# Patient Record
Sex: MALE | Race: WHITE | NOT HISPANIC OR LATINO | URBAN - METROPOLITAN AREA
[De-identification: names, ages, dates, MRNs, and addresses within clinical notes are randomized per-mention and may not be internally consistent; named-entity substitution may affect disease eponyms.]

---

## 2017-02-22 ENCOUNTER — OUTPATIENT (OUTPATIENT)
Dept: OUTPATIENT SERVICES | Facility: HOSPITAL | Age: 47
LOS: 1 days | Discharge: HOME | End: 2017-02-22

## 2017-03-13 ENCOUNTER — APPOINTMENT (OUTPATIENT)
Dept: SURGERY | Facility: CLINIC | Age: 47
End: 2017-03-13

## 2017-03-13 PROBLEM — Z00.00 ENCOUNTER FOR PREVENTIVE HEALTH EXAMINATION: Status: ACTIVE | Noted: 2017-03-13

## 2017-06-27 DIAGNOSIS — T81.4XXA INFECTION FOLLOWING A PROCEDURE, INITIAL ENCOUNTER: ICD-10-CM

## 2021-06-25 ENCOUNTER — APPOINTMENT (OUTPATIENT)
Age: 51
End: 2021-06-25

## 2022-05-13 ENCOUNTER — APPOINTMENT (OUTPATIENT)
Age: 52
End: 2022-05-13
Payer: COMMERCIAL

## 2022-05-13 VITALS
DIASTOLIC BLOOD PRESSURE: 84 MMHG | OXYGEN SATURATION: 96 % | WEIGHT: 230 LBS | HEIGHT: 72 IN | HEART RATE: 86 BPM | SYSTOLIC BLOOD PRESSURE: 140 MMHG | BODY MASS INDEX: 31.15 KG/M2

## 2022-05-13 DIAGNOSIS — R07.9 CHEST PAIN, UNSPECIFIED: ICD-10-CM

## 2022-05-13 DIAGNOSIS — R91.1 SOLITARY PULMONARY NODULE: ICD-10-CM

## 2022-05-13 PROCEDURE — 99213 OFFICE O/P EST LOW 20 MIN: CPT

## 2022-05-21 PROBLEM — R07.9 CHEST PAIN, UNSPECIFIED: Status: ACTIVE | Noted: 2022-05-21

## 2022-05-21 PROBLEM — R91.1 LUNG NODULE: Status: ACTIVE | Noted: 2022-05-21

## 2022-06-27 ENCOUNTER — APPOINTMENT (OUTPATIENT)
Dept: NEUROLOGY | Facility: CLINIC | Age: 52
End: 2022-06-27
Payer: COMMERCIAL

## 2022-06-27 VITALS
HEART RATE: 106 BPM | BODY MASS INDEX: 29.93 KG/M2 | TEMPERATURE: 95.9 F | RESPIRATION RATE: 16 BRPM | SYSTOLIC BLOOD PRESSURE: 120 MMHG | DIASTOLIC BLOOD PRESSURE: 80 MMHG | WEIGHT: 221 LBS | OXYGEN SATURATION: 98 % | HEIGHT: 72 IN

## 2022-06-27 DIAGNOSIS — M54.12 RADICULOPATHY, CERVICAL REGION: ICD-10-CM

## 2022-06-27 PROCEDURE — 99204 OFFICE O/P NEW MOD 45 MIN: CPT

## 2022-06-27 NOTE — PHYSICAL EXAM
[FreeTextEntry1] : Motor: atrophy of right supraspinatus, infraspinatus, deltoid, and less so biceps; right shoulder abduction 2/5, external rotation 2/5, elbow flexion in supinated position 2/5, in half pronated position 4-/5, elbow extension 5/5, distally 5/5 b/l UE \par Sensory: intact to LT/PP in b/l UE \par Reflexes: right biceps absent, brachioradialis 1+, triceps 2+; LUE 2+ \par Gait: normal

## 2022-06-27 NOTE — CONSULT LETTER
[Dear  ___] : Dear  [unfilled], [Consult Letter:] : I had the pleasure of evaluating your patient, [unfilled]. [Please see my note below.] : Please see my note below. [Consult Closing:] : Thank you very much for allowing me to participate in the care of this patient.  If you have any questions, please do not hesitate to contact me. [Sincerely,] : Sincerely, [FreeTextEntry3] : Chadwick Bernard M.D.\par Neurology, Electromyography and Neuromuscular Medicine\par [facility]\par \par  of Neurology\par [facility] / [facility] [employer]

## 2022-06-27 NOTE — ASSESSMENT
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

## 2022-06-27 NOTE — HISTORY OF PRESENT ILLNESS
[FreeTextEntry1] : Referred by Dr. Mai Villalobos for right shoulder weakness\par Symptoms started a few years ago with progressive weakness without significant pain \par He was found to have tears in the rotator cuff and biceps tendon, had surgery 2.5 years ago followed by PT without improvement\par He has numbness in the right lateral upper arm, also some muscle spasms around the shoulder\par \par Reviewed:\par NCS/EMG 2021 - right upper brachial plexopathy \par x-ray 5/2022 - elevation of right hemidiaphragm\par pulmonology note - no pulmonary symptoms

## 2023-01-31 ENCOUNTER — NON-APPOINTMENT (OUTPATIENT)
Age: 53
End: 2023-01-31

## 2024-03-25 ENCOUNTER — APPOINTMENT (OUTPATIENT)
Dept: PAIN MANAGEMENT | Facility: CLINIC | Age: 54
End: 2024-03-25
Payer: COMMERCIAL

## 2024-03-25 DIAGNOSIS — G54.0 BRACHIAL PLEXUS DISORDERS: ICD-10-CM

## 2024-03-25 PROCEDURE — 20610 DRAIN/INJ JOINT/BURSA W/O US: CPT | Mod: 50

## 2024-03-25 PROCEDURE — 99204 OFFICE O/P NEW MOD 45 MIN: CPT | Mod: 25

## 2024-03-25 RX ORDER — METHOCARBAMOL 750 MG/1
750 TABLET, FILM COATED ORAL
Qty: 30 | Refills: 1 | Status: ACTIVE | COMMUNITY
Start: 2024-03-25 | End: 1900-01-01

## 2024-03-25 NOTE — PHYSICAL EXAM
[de-identified] : SHOULDER- AB duction pain with resistance. Hawkin's positive on the left.  right shoulder--pain with PROM in abduction

## 2024-03-25 NOTE — PROCEDURE
[Large Joint Injection] : Large joint injection [Shoulder] : shoulder [Pain] : pain [Alcohol] : alcohol [___ cc    0.25%] : Bupivacaine (Marcaine) ~Vcc of 0.25%  [___ cc    4mg] : Dexamethasone (Decadron) ~Vcc of 4 mg  [Call if redness, pain or fever occur] : call if redness, pain or fever occur [Apply ice for 15min out of every hour for the next 12-24 hours as tolerated] : apply ice for 15 minutes out of every hour for the next 12-24 hours as tolerated [Previous OTC use and PT nontherapeutic] : patient has tried OTC's including aspirin, Ibuprofen, Aleve, etc or prescription NSAIDS, and/or exercises at home and/or physical therapy without satisfactory response [Patient had decreased mobility in the joint] : patient had decreased mobility in the joint [Risks, benefits, alternatives discussed / Verbal consent obtained] : the risks benefits, and alternatives have been discussed, and verbal consent was obtained [Bilateral] : bilaterally of the

## 2024-03-25 NOTE — HISTORY OF PRESENT ILLNESS
[FreeTextEntry1] : HISTORY OF PRESENT ILLNESS: Mr. Antonio is a 53-year-old male complaining of BL shoulder pain. The pain started after no specific injury or event. The patient has had this pain for years.  Patient describes the pain as moderate to severe.  During the last month the pain has been nearly constant with symptoms worsening in no typical pattern.   Mr. Antonio admits to BL dull, achy pain associated with spasms with overhead activity without associated numbness, tingling or radicular pain past the elbow for more than 6 weeks). Patient also has pain with laying on his affected side. Pain is 8/10 in intensity. He has tried NSAIDs and physical therapy without significant relief.  ACTIVITIES:  Patient uses no assisted walking device at this time.  Patient has difficulty performing household chores, going to work, doing yardwork or shopping, participating in recreational activities & exercise at this time.   PRIOR PAIN TREATMENTS:  No relief with surgery, physical therapy, exercise, TENS, heat treatment & cold treatment.  Prior Pain Medications: None mentioned.

## 2024-03-25 NOTE — DISCUSSION/SUMMARY
[de-identified] : A discussion regarding available pain management treatment options occurred with the patient. These included interventional, rehabilitative, pharmacological, and alternative modalities. We will proceed with the following:   Interventional treatment options: 1. Patient will proceed with a BL shoulder injection in office today.  Risks with the procedure such as bleeding and infection was discussed in detail.   Rehabilitative options: -Participation in active HEP was discussed and printed.   Medication based treatment options: - ordered Meloxicam 15mg daily for 4 weeks. Discussed risks and benefits. Avoid taking for any side effects. - patient is aware to take for 2 weeks straight than take 1 week off before repeating. - ordered Methocarbamol 750mg Qhs for a duration of 4 weeks.    Complementary treatment options: - Patient was advised to stay away from any heavy lifting. If needed, he was advised to squat and not bend forward. - Initiate physician directed activity and lifestyle modifications.  Follow up in 4-6 weeks for reassessment.   I, Pola Orellana, attest that this documentation has been prepared under the direction and in the presence of Provider Allen Shaw, DO The documentation recorded by the scribe, in my presence, accurately reflects the service I personally performed, and the decisions made by me with my edits as appropriate.   Best Regards, Allen Shaw D.O.

## 2024-04-25 ENCOUNTER — RX RENEWAL (OUTPATIENT)
Age: 54
End: 2024-04-25

## 2024-04-25 RX ORDER — MELOXICAM 15 MG/1
15 TABLET ORAL DAILY
Qty: 30 | Refills: 0 | Status: ACTIVE | COMMUNITY
Start: 2024-03-25 | End: 1900-01-01

## 2024-05-06 ENCOUNTER — APPOINTMENT (OUTPATIENT)
Dept: PAIN MANAGEMENT | Facility: CLINIC | Age: 54
End: 2024-05-06
Payer: COMMERCIAL

## 2024-05-06 VITALS — BODY MASS INDEX: 29.93 KG/M2 | HEIGHT: 72 IN | WEIGHT: 221 LBS

## 2024-05-06 PROCEDURE — 20550 NJX 1 TENDON SHEATH/LIGAMENT: CPT | Mod: LT

## 2024-05-06 PROCEDURE — 99214 OFFICE O/P EST MOD 30 MIN: CPT | Mod: 25

## 2024-05-06 NOTE — PROCEDURE
[Shoulder] : shoulder [Pain] : pain [Alcohol] : alcohol [___ cc    0.25%] : Bupivacaine (Marcaine) ~Vcc of 0.25%  [___ cc    4mg] : Dexamethasone (Decadron) ~Vcc of 4 mg  [Call if redness, pain or fever occur] : call if redness, pain or fever occur [Apply ice for 15min out of every hour for the next 12-24 hours as tolerated] : apply ice for 15 minutes out of every hour for the next 12-24 hours as tolerated [Previous OTC use and PT nontherapeutic] : patient has tried OTC's including aspirin, Ibuprofen, Aleve, etc or prescription NSAIDS, and/or exercises at home and/or physical therapy without satisfactory response [Patient had decreased mobility in the joint] : patient had decreased mobility in the joint [Risks, benefits, alternatives discussed / Verbal consent obtained] : the risks benefits, and alternatives have been discussed, and verbal consent was obtained [Left] : of the left [Proximal biceps tendon sheath] : proximal biceps tendon sheath [Tendon Sheath] : tendon sheath

## 2024-05-06 NOTE — HISTORY OF PRESENT ILLNESS
[FreeTextEntry1] : HISTORY OF PRESENT ILLNESS: Mr. Antonio is a 53-year-old male complaining of BL shoulder pain. The pain started after no specific injury or event. The patient has had this pain for years.  Patient describes the pain as moderate to severe.  During the last month the pain has been nearly constant with symptoms worsening in no typical pattern.   Mr. Antonio admits to BL dull, achy pain associated with spasms with overhead activity without associated numbness, tingling or radicular pain past the elbow for more than 6 weeks). Patient also has pain with laying on his affected side. Pain is 8/10 in intensity. He has tried NSAIDs and physical therapy without significant relief.  PRESENTING TODAY 05-: Patient presents to the office today for a follow up visit with continued BL shoulder pain. He notes 20% decrease in pain regarding his left shoulder. He continues with more pain toward the front of his left shoulder. Pain is made worse with overhead activity without associated numbness, tingling or radicular pain past the elbow. He has done more than 6 weeks of provider guided home exercises, 3/4x per week, with minimal to no relief. He has failed the BL shoulder injection since the last visit. He rates the pain an 8 out of 10 on the pain scale today. Patient denies any bowel or bladder dysfunction, incontinence, or saddle anesthesia.

## 2024-05-06 NOTE — PHYSICAL EXAM
[de-identified] : SHOULDER- AB duction pain with resistance. Hawkin's positive on the left. Pain with resisted SAB.  ttp to left anterioshoulder at biceps grrove right shoulder--pain with PROM in abduction

## 2024-05-06 NOTE — DISCUSSION/SUMMARY
[de-identified] : A discussion regarding available pain management treatment options occurred with the patient. These included interventional, rehabilitative, pharmacological, and alternative modalities. We will proceed with the following:   Interventional treatment options: 1. Patient will proceed with a LT shoulder bicep tendon sheath injection in office today.  Risks with the procedure such as bleeding and infection was discussed in detail.   Rehabilitative options: - c/w participation in active HEP as discussed.   Medication based treatment options: - c/w Meloxicam 15mg daily for 4 weeks. Discussed risks and benefits. Avoid taking for any side effects. - patient is aware to take for 2 weeks straight than take 1 week off before repeating. - c/w Methocarbamol 750mg Qhs for a duration of 4 weeks.    Complementary treatment options: - Patient was advised to stay away from any heavy lifting. If needed, he was advised to squat and not bend forward. - physician directed activity and lifestyle modifications.  Follow up in 4-6 weeks for reassessment.   I, Pola Orellana, attest that this documentation has been prepared under the direction and in the presence of Provider Allen Shaw, DO The documentation recorded by the scribe, in my presence, accurately reflects the service I personally performed, and the decisions made by me with my edits as appropriate.   Best Regards, Allen Shaw D.O.

## 2024-06-11 ENCOUNTER — APPOINTMENT (OUTPATIENT)
Dept: PAIN MANAGEMENT | Facility: CLINIC | Age: 54
End: 2024-06-11
Payer: COMMERCIAL

## 2024-06-11 DIAGNOSIS — M25.519 PAIN IN UNSPECIFIED SHOULDER: ICD-10-CM

## 2024-06-11 DIAGNOSIS — M75.22 BICIPITAL TENDINITIS, LEFT SHOULDER: ICD-10-CM

## 2024-06-11 PROCEDURE — 99214 OFFICE O/P EST MOD 30 MIN: CPT

## 2024-06-11 RX ORDER — DICLOFENAC SODIUM 75 MG/1
75 TABLET, DELAYED RELEASE ORAL
Qty: 60 | Refills: 0 | Status: ACTIVE | COMMUNITY
Start: 2024-06-11 | End: 1900-01-01

## 2024-06-11 RX ORDER — TRAMADOL HYDROCHLORIDE 50 MG/1
50 TABLET, COATED ORAL
Qty: 42 | Refills: 0 | Status: ACTIVE | COMMUNITY
Start: 2024-06-11 | End: 1900-01-01

## 2024-06-11 NOTE — DISCUSSION/SUMMARY
[de-identified] : A discussion regarding available pain management treatment options occurred with the patient. These included interventional, rehabilitative, pharmacological, and alternative modalities. We will proceed with the following:   Interventional treatment options: - Deferred at this time.   Rehabilitative options: - Initiate Physical therapy of the left shoulder 2-3 times a week for 4-8 weeks stressing a home exercise program of walking, shoulder griddle strengthening, swimming, elliptical , recumbent bike, Rahul chi and Yoga. Use things that heat like hot shower or icy heat before rehab, exercising and at the beginning of the day, and ice (ice in a bag never directly on the skin) after activity and at the end of the day.    Medication based treatment options: - ordered Diclofenac 75mg BID for 4 weeks. Discussed risks and benefits. Avoid taking for any side effects. - patient is aware to take for 2 weeks straight than take 1 week off before repeating. - c/w Methocarbamol 750mg QHS for a duration of 4 weeks.  - ordered Tramadol 50mg TID for severe pain breakthrough for a duration of 2 weeks.    Complementary treatment options: - Patient was advised to stay away from any heavy lifting. If needed, he was advised to squat and not bend forward. - physician directed activity and lifestyle modifications.  Follow up in 4-6 weeks for reassessment.   I, Pola Orellana, attest that this documentation has been prepared under the direction and in the presence of Provider Allen Shaw, DO The documentation recorded by the scribe, in my presence, accurately reflects the service I personally performed, and the decisions made by me with my edits as appropriate.   Best Regards, Allen Shaw D.O.

## 2024-06-11 NOTE — PHYSICAL EXAM
[de-identified] : SHOULDER- AB duction pain with resistance. Hawkin's positive on the left. Pain with resisted SAB.  ttp to left anterioshoulder at biceps grrove right shoulder--pain with PROM in abduction

## 2024-06-11 NOTE — HISTORY OF PRESENT ILLNESS
[FreeTextEntry1] : HISTORY OF PRESENT ILLNESS: Mr. Antonio is a 53-year-old male complaining of BL shoulder pain. The pain started after no specific injury or event. The patient has had this pain for years.  Patient describes the pain as moderate to severe.  During the last month the pain has been nearly constant with symptoms worsening in no typical pattern.   Mr. Antonio admits to BL dull, achy pain associated with spasms with overhead activity without associated numbness, tingling or radicular pain past the elbow for more than 6 weeks). Patient also has pain with laying on his affected side. Pain is 8/10 in intensity. He has tried NSAIDs and physical therapy without significant relief.  PRESENTING TODAY 06-: Patient presents to the office today for a follow up visit with continued BL shoulder pain. He is status post a LT shoulder bicep tendon sheath injection since the last visit. He notes 20% decrease in pain regarding his left shoulder. The pain has returned to baseline. He continues with more pain toward the front of his left shoulder. Pain is made worse with overhead activity without associated numbness, tingling or radicular pain past the elbow. He has done more than 6 weeks of provider guided home exercises, 3/4x per week, with minimal to no relief. He has failed the BL shoulder injection since the last visit. He rates the pain an 8 out of 10 on the pain scale today. Patient denies any bowel or bladder dysfunction, incontinence, or saddle anesthesia.

## 2024-07-30 ENCOUNTER — APPOINTMENT (OUTPATIENT)
Dept: PAIN MANAGEMENT | Facility: CLINIC | Age: 54
End: 2024-07-30